# Patient Record
Sex: FEMALE | Race: WHITE
[De-identification: names, ages, dates, MRNs, and addresses within clinical notes are randomized per-mention and may not be internally consistent; named-entity substitution may affect disease eponyms.]

---

## 2017-03-23 ENCOUNTER — HOSPITAL ENCOUNTER (OUTPATIENT)
Dept: HOSPITAL 45 - C.RDSM | Age: 57
Discharge: HOME | End: 2017-03-23
Attending: PHYSICIAN ASSISTANT
Payer: COMMERCIAL

## 2017-03-23 DIAGNOSIS — Z96.653: ICD-10-CM

## 2017-03-23 DIAGNOSIS — M17.10: Primary | ICD-10-CM

## 2017-03-23 NOTE — DIAGNOSTIC IMAGING REPORT
BILATERAL KNEES 4 VIEWS



CLINICAL HISTORY: Bilateral knee pain    



COMPARISON STUDY:  3/14/2016



FINDINGS: There are postsurgical changes of bilateral total knee arthroplasties

and patellar resurfacing. No acute fractures are visualized. There are no

erosive or destructive changes. There are suspected trace joint effusions.



IMPRESSION:  Postsurgical changes of bilateral total knee arthroplasties. No

fractures or destructive lesions are visualized. 









Electronically signed by:  Reed Cox M.D.

3/23/2017 10:30 AM



Dictated Date/Time:  3/23/2017 10:28 AM

## 2018-04-16 ENCOUNTER — HOSPITAL ENCOUNTER (OUTPATIENT)
Dept: HOSPITAL 45 - C.RDSM | Age: 58
Discharge: HOME | End: 2018-04-16
Attending: PHYSICIAN ASSISTANT
Payer: COMMERCIAL

## 2018-04-16 DIAGNOSIS — Z96.653: Primary | ICD-10-CM

## 2018-04-16 NOTE — DIAGNOSTIC IMAGING REPORT
R KNEE 1 OR 2 VIEWS



CLINICAL HISTORY: S/P TOTAL KNEE ARTHROPLASTY joint replacement



COMPARISON: 3/23/2017



DISCUSSION: Bilateral total knee arthroplasties. Good contact between prosthetic

and underlying bone. There is no evidence for soft tissue swelling.



IMPRESSION: Unremarkable exam post bilateral total knee arthroplasties.











The above report was generated using voice recognition software.  It may contain

grammatical, syntax or spelling errors.







Electronically signed by:  Candido Quinones M.D.

4/16/2018 10:46 AM



Dictated Date/Time:  4/16/2018 10:44 AM